# Patient Record
Sex: FEMALE | Race: WHITE | NOT HISPANIC OR LATINO
[De-identification: names, ages, dates, MRNs, and addresses within clinical notes are randomized per-mention and may not be internally consistent; named-entity substitution may affect disease eponyms.]

---

## 2022-08-30 ENCOUNTER — RESULT REVIEW (OUTPATIENT)
Age: 62
End: 2022-08-30

## 2022-08-30 ENCOUNTER — APPOINTMENT (OUTPATIENT)
Dept: OTOLARYNGOLOGY | Facility: CLINIC | Age: 62
End: 2022-08-30

## 2022-08-30 VITALS — HEIGHT: 63 IN | SYSTOLIC BLOOD PRESSURE: 126 MMHG | DIASTOLIC BLOOD PRESSURE: 79 MMHG | HEART RATE: 83 BPM

## 2022-08-30 DIAGNOSIS — J45.909 UNSPECIFIED ASTHMA, UNCOMPLICATED: ICD-10-CM

## 2022-08-30 DIAGNOSIS — J34.2 DEVIATED NASAL SEPTUM: ICD-10-CM

## 2022-08-30 DIAGNOSIS — J32.9 CHRONIC SINUSITIS, UNSPECIFIED: ICD-10-CM

## 2022-08-30 DIAGNOSIS — J34.89 OTHER SPECIFIED DISORDERS OF NOSE AND NASAL SINUSES: ICD-10-CM

## 2022-08-30 DIAGNOSIS — J31.0 CHRONIC RHINITIS: ICD-10-CM

## 2022-08-30 DIAGNOSIS — R43.0 ANOSMIA: ICD-10-CM

## 2022-08-30 PROCEDURE — 70486 CT MAXILLOFACIAL W/O DYE: CPT

## 2022-08-30 PROCEDURE — 99204 OFFICE O/P NEW MOD 45 MIN: CPT | Mod: 25

## 2022-08-30 PROCEDURE — 31231 NASAL ENDOSCOPY DX: CPT

## 2022-08-30 RX ORDER — FLUTICASONE PROPIONATE 50 UG/1
SPRAY, METERED NASAL
Refills: 0 | Status: ACTIVE | COMMUNITY

## 2022-08-30 RX ORDER — OXYCODONE 10 MG/1
10 TABLET ORAL
Refills: 0 | Status: ACTIVE | COMMUNITY
Start: 2022-08-30

## 2022-08-30 RX ORDER — FLUOXETINE HCL 10 MG
TABLET ORAL
Refills: 0 | Status: ACTIVE | COMMUNITY

## 2022-08-30 RX ORDER — ALBUTEROL 90 MCG
AEROSOL (GRAM) INHALATION
Refills: 0 | Status: ACTIVE | COMMUNITY

## 2022-08-30 RX ORDER — APREMILAST 30 MG/1
TABLET, FILM COATED ORAL
Refills: 0 | Status: ACTIVE | COMMUNITY

## 2022-08-30 NOTE — REVIEW OF SYSTEMS
[Post Nasal Drip] : post nasal drip [Nasal Congestion] : nasal congestion [Recurrent Sinus Infections] : recurrent sinus infections [Problem Snoring] : problem snoring [Sinus Pain] : sinus pain [Sinus Pressure] : sinus pressure [Sense Of Smell Problem] : sense of smell problem [Discolored Nasal Discharge] : discolored nasal discharge [Shortness Of Breath] : shortness of breath [Negative] : Heme/Lymph [FreeTextEntry7] : difficulty swallowing [FreeTextEntry9] : joint aches

## 2022-08-30 NOTE — HISTORY OF PRESENT ILLNESS
[de-identified] : Patient presents stating that she saw Dr. Montenegro for chronic sinus infections that’s she been getting. She states she has immuno deficiency problems now. She adds that she has been having dental work, teeth being pulled and it seems that shortly after having the dental work she got this sinus infection. Dr. Montenegro put her on Levaquin for 10 days, it didn’t help so he put her on it again for another 10 days, which she just finished. Patient states she constantly congested and when shes congested she notices hearing problems.

## 2022-08-30 NOTE — PROCEDURE
[Recalcitrant Symptoms] : recalcitrant symptoms  [FreeTextEntry6] : Flexible Nasal Endoscopy: \par left Side:\par -lots of crusting\par -lots of scabbing\par -thick yellow coating middle turb\par -maxillary and ethmoids look open\par -clear to the nasopharynx\par Right Side:\par -deviated septum\par -really cant get through to see anything otherwise

## 2022-08-30 NOTE — CONSULT LETTER
[Dear  ___] : Dear  [unfilled], [Courtesy Letter:] : I had the pleasure of seeing your patient, [unfilled], in my office today. [Please see my note below.] : Please see my note below. [Consult Closing:] : Thank you very much for allowing me to participate in the care of this patient.  If you have any questions, please do not hesitate to contact me. [Sincerely,] : Sincerely, [FreeTextEntry1] : Joe Reyes MD FACS

## 2022-08-30 NOTE — ASSESSMENT
[FreeTextEntry1] : Reviewed and reconciled medications, allergies, PMHx, PSHx, SocHx, FMHx \par \par Patient presents stating she has been getting chronic sinus infections - just finished r rounds of Levaquin but it didn’t help.\par \par Physical Exam:\par -diffusely tender over all the sinuses, right greater than left\par -Fair amount of scarring between turbinate and septum on the left side\par -Mucoid discharge on the right side between the septum and the turbinate\par -There is a space in between\par tonsils class 2\par \par Flexible Nasal Endoscopy: \par left Side:\par -lots of crusting\par -lots of scabbing\par -thick yellow coating middle turb\par -maxillary and ethmoids look open\par -clear to the nasopharynx\par Right Side:\par -deviated septum\par -really cant get through to see anything otherwise \par \par Mini CAT Sinus:\par -maxillary: left side has thickening inside the sinus although the opening is wide open. Right side is not open.\par -turbinates are bulging all the way out and the septum is bending\par -left ethmoidal sinus is fairly open but the right side is filled and thickened\par -sphenoid: the right is thick in the lining but it's open. Th left is really tiny but open \par -frontal: whole right side has stuff in it. \par \par Plan: Flexible Nasal Endoscopy. Mini CAT sent off to radiologist. Need to use sinus rinse - a whole bottle a day. FU once we get the official reading.

## 2022-10-14 RX ORDER — LEVOFLOXACIN 500 MG/1
500 TABLET, FILM COATED ORAL DAILY
Qty: 10 | Refills: 0 | Status: ACTIVE | COMMUNITY
Start: 2022-10-14 | End: 1900-01-01

## 2022-10-17 RX ORDER — BENZONATATE 100 MG/1
100 CAPSULE ORAL 3 TIMES DAILY
Qty: 21 | Refills: 0 | Status: ACTIVE | COMMUNITY
Start: 2022-10-17 | End: 1900-01-01

## 2022-10-18 ENCOUNTER — APPOINTMENT (OUTPATIENT)
Dept: OTOLARYNGOLOGY | Facility: AMBULATORY MEDICAL SERVICES | Age: 62
End: 2022-10-18

## 2022-10-25 ENCOUNTER — APPOINTMENT (OUTPATIENT)
Dept: OTOLARYNGOLOGY | Facility: CLINIC | Age: 62
End: 2022-10-25

## 2022-11-10 ENCOUNTER — APPOINTMENT (OUTPATIENT)
Dept: OTOLARYNGOLOGY | Facility: AMBULATORY MEDICAL SERVICES | Age: 62
End: 2022-11-10

## 2022-11-18 ENCOUNTER — APPOINTMENT (OUTPATIENT)
Dept: OTOLARYNGOLOGY | Facility: CLINIC | Age: 62
End: 2022-11-18

## 2023-11-13 NOTE — PHYSICAL EXAM
[Midline] : trachea located in midline position [Normal] : salivary glands are normal [FreeTextEntry1] : -Fair amount of scarring between turbinate and septum on the left side\par -Mucoid discharge on the right side between the septum and the turbinate\par -There is a space in between [de-identified] : class 2 [FreeTextEntry2] : diffusely tender over all the sinuses, right greater than left No